# Patient Record
Sex: FEMALE | Race: WHITE | NOT HISPANIC OR LATINO | URBAN - METROPOLITAN AREA
[De-identification: names, ages, dates, MRNs, and addresses within clinical notes are randomized per-mention and may not be internally consistent; named-entity substitution may affect disease eponyms.]

---

## 2020-03-17 ENCOUNTER — OFFICE VISIT (OUTPATIENT)
Dept: URBAN - METROPOLITAN AREA CLINIC 25 | Facility: CLINIC | Age: 65
End: 2020-03-17
Payer: OTHER GOVERNMENT

## 2020-03-17 DIAGNOSIS — H40.023 OPEN ANGLE WITH BORDERLINE FINDINGS, HIGH RISK, BILATERAL: ICD-10-CM

## 2020-03-17 PROCEDURE — 92004 COMPRE OPH EXAM NEW PT 1/>: CPT | Performed by: OPTOMETRIST

## 2020-03-17 RX ORDER — OLOPATADINE HYDROCHLORIDE 2 MG/ML
0.2 % SOLUTION/ DROPS OPHTHALMIC
Qty: 1 | Refills: 3 | Status: ACTIVE
Start: 2020-03-17

## 2020-03-17 ASSESSMENT — INTRAOCULAR PRESSURE
OS: 27
OD: 28

## 2020-03-17 ASSESSMENT — VISUAL ACUITY
OD: 20/20
OS: 20/20

## 2021-04-27 ENCOUNTER — OFFICE VISIT (OUTPATIENT)
Dept: URBAN - METROPOLITAN AREA CLINIC 25 | Facility: CLINIC | Age: 66
End: 2021-04-27
Payer: OTHER GOVERNMENT

## 2021-04-27 DIAGNOSIS — H40.033 ANATOMICAL NARROW ANGLE, BILATERAL: ICD-10-CM

## 2021-04-27 DIAGNOSIS — H52.4 PRESBYOPIA: ICD-10-CM

## 2021-04-27 DIAGNOSIS — H16.223 KERATOCONJUNCTIVITIS SICCA, NOT SPECIFIED AS SJÖGREN'S, BILATERAL: Primary | ICD-10-CM

## 2021-04-27 PROCEDURE — 99213 OFFICE O/P EST LOW 20 MIN: CPT | Performed by: OPTOMETRIST

## 2021-04-27 RX ORDER — PREDNISOLONE ACETATE 10 MG/ML
1 % SUSPENSION/ DROPS OPHTHALMIC
Qty: 5 | Refills: 0 | Status: ACTIVE
Start: 2021-04-27

## 2021-04-27 ASSESSMENT — VISUAL ACUITY
OD: 20/20
OS: 20/20

## 2021-04-27 ASSESSMENT — INTRAOCULAR PRESSURE
OS: 16
OD: 20

## 2022-11-03 ENCOUNTER — OFFICE VISIT (OUTPATIENT)
Dept: URBAN - METROPOLITAN AREA CLINIC 28 | Facility: CLINIC | Age: 67
End: 2022-11-03
Payer: MEDICARE

## 2022-11-03 DIAGNOSIS — H52.4 PRESBYOPIA: ICD-10-CM

## 2022-11-03 DIAGNOSIS — H25.13 AGE-RELATED NUCLEAR CATARACT, BILATERAL: ICD-10-CM

## 2022-11-03 DIAGNOSIS — H40.033 ANATOMICAL NARROW ANGLE, BILATERAL: Primary | ICD-10-CM

## 2022-11-03 PROCEDURE — 92004 COMPRE OPH EXAM NEW PT 1/>: CPT | Performed by: OPTOMETRIST

## 2022-11-03 PROCEDURE — 92133 CPTRZD OPH DX IMG PST SGM ON: CPT | Performed by: OPTOMETRIST

## 2022-11-03 ASSESSMENT — INTRAOCULAR PRESSURE
OS: 18
OD: 18

## 2022-11-03 ASSESSMENT — VISUAL ACUITY
OD: 20/20
OS: 20/20

## 2022-11-03 NOTE — IMPRESSION/PLAN
Impression: Anatomical narrow angle, bilateral: H40.033. Plan: Discussed diagnosis in detail with patient. Discussed treatment options with patient. OCT ON ordered & reviewed with patient No change to current treatment. Will continue to observe condition and or symptoms.

## 2024-03-06 ENCOUNTER — OFFICE VISIT (OUTPATIENT)
Dept: URBAN - METROPOLITAN AREA CLINIC 28 | Facility: CLINIC | Age: 69
End: 2024-03-06
Payer: MEDICARE

## 2024-03-06 DIAGNOSIS — H52.4 PRESBYOPIA: ICD-10-CM

## 2024-03-06 DIAGNOSIS — H52.03 HYPERMETROPIA, BILATERAL: ICD-10-CM

## 2024-03-06 DIAGNOSIS — H16.223 KERATOCONJUNCTIVITIS SICCA, NOT SPECIFIED AS SJ”GREN'S, BILATERAL: ICD-10-CM

## 2024-03-06 DIAGNOSIS — H25.813 COMBINED FORMS OF AGE-RELATED CATARACT, BILATERAL: ICD-10-CM

## 2024-03-06 DIAGNOSIS — H40.033 ANATOMICAL NARROW ANGLE, BILATERAL: Primary | ICD-10-CM

## 2024-03-06 PROCEDURE — 92014 COMPRE OPH EXAM EST PT 1/>: CPT | Performed by: OPTOMETRIST

## 2024-03-06 PROCEDURE — 92133 CPTRZD OPH DX IMG PST SGM ON: CPT | Performed by: OPTOMETRIST

## 2024-03-06 ASSESSMENT — INTRAOCULAR PRESSURE
OS: 17
OD: 17

## 2024-03-06 ASSESSMENT — KERATOMETRY
OS: 44.13
OD: 44.50

## 2024-03-06 ASSESSMENT — VISUAL ACUITY
OS: 20/20
OD: 20/25

## 2025-03-25 ENCOUNTER — OFFICE VISIT (OUTPATIENT)
Dept: URBAN - METROPOLITAN AREA CLINIC 28 | Facility: CLINIC | Age: 70
End: 2025-03-25
Payer: MEDICARE

## 2025-03-25 DIAGNOSIS — H25.813 COMBINED FORMS OF AGE-RELATED CATARACT, BILATERAL: ICD-10-CM

## 2025-03-25 DIAGNOSIS — H40.033 ANATOMICAL NARROW ANGLE, BILATERAL: Primary | ICD-10-CM

## 2025-03-25 DIAGNOSIS — H52.4 PRESBYOPIA: ICD-10-CM

## 2025-03-25 DIAGNOSIS — H16.223 KERATOCONJUNCTIVITIS SICCA, NOT SPECIFIED AS SJ”GREN'S, BILATERAL: ICD-10-CM

## 2025-03-25 PROCEDURE — 92014 COMPRE OPH EXAM EST PT 1/>: CPT | Performed by: OPTOMETRIST

## 2025-03-25 ASSESSMENT — INTRAOCULAR PRESSURE
OS: 18
OD: 17

## 2025-03-25 ASSESSMENT — KERATOMETRY
OD: 44.63
OS: 44.25

## 2025-03-25 ASSESSMENT — VISUAL ACUITY
OD: 20/25
OS: 20/20